# Patient Record
Sex: MALE | Race: BLACK OR AFRICAN AMERICAN | Employment: OTHER | URBAN - METROPOLITAN AREA
[De-identification: names, ages, dates, MRNs, and addresses within clinical notes are randomized per-mention and may not be internally consistent; named-entity substitution may affect disease eponyms.]

---

## 2017-04-18 RX ORDER — ATORVASTATIN CALCIUM 40 MG/1
TABLET, FILM COATED ORAL
Qty: 90 TABLET | Refills: 1 | Status: SHIPPED | OUTPATIENT
Start: 2017-04-18 | End: 2017-11-21 | Stop reason: SDUPTHER

## 2017-06-01 RX ORDER — INSULIN GLARGINE 100 [IU]/ML
INJECTION, SOLUTION SUBCUTANEOUS
Refills: 2 | OUTPATIENT
Start: 2017-06-01

## 2017-06-01 RX ORDER — INSULIN ASPART 100 [IU]/ML
INJECTION, SOLUTION INTRAVENOUS; SUBCUTANEOUS
Refills: 1 | OUTPATIENT
Start: 2017-06-01

## 2017-09-05 ENCOUNTER — TELEPHONE (OUTPATIENT)
Dept: CARDIOLOGY CLINIC | Age: 62
End: 2017-09-05

## 2017-09-06 ENCOUNTER — TELEPHONE (OUTPATIENT)
Dept: CARDIOLOGY CLINIC | Age: 62
End: 2017-09-06

## 2017-10-16 ENCOUNTER — TELEPHONE (OUTPATIENT)
Dept: CARDIOLOGY CLINIC | Age: 62
End: 2017-10-16

## 2017-10-16 NOTE — TELEPHONE ENCOUNTER
Called Dentist office [phone], spoke with Edie Barbosa. Informed her that patient has not been seen in our office for over 2-1/2/ years. She will call us back. Patient has an appointment scheduled with Dr. Rayne Espinosa on 11/21/17. No medications currently being filled by our office. Patient last seen in office on 2/6/15. Notes:   Assessment:      CAD (coronary artery disease)  Pt denies symptoms of angina today. Pt is on BBlocker, Statin and Antiplatelet therapy. I will discontinue Effient since he had his PCI in August of 2013 and he is unable to afford Effient at the present time.            HTN (hypertension)  Blood pressure is elevated today. He has not taken his blood pressure medicines this morning. I have encouraged him to take his medicines regularly and also work on low sodium diet.      Hyperlipidemia  Last lipid panel from 6/2014 was within acceptable limits. Pt will have liver/ lipid profile rechecked before the next visit.                                   Plan:         1. See above  2. Stop Effient. 3. Strongly encouraged to take BP medicines regularly and follow low sodium diet. 4. Have fasting lipids/liver prior to next visit. 5. Continue risk factor modifications.   6. Follow up in six months.

## 2017-10-16 NOTE — TELEPHONE ENCOUNTER
Dr. Rayne Espinosa is OOT this week. Unable to provide any type of clearance since he hasn't been seen in going on 3 years. He should obtain clearance from PCP. Otherwise, he can schedule the tooth extraction after he sees Dr. Rayne Espinosa at his next scheduled appt.

## 2017-10-26 LAB
AVERAGE GLUCOSE: NORMAL
HBA1C MFR BLD: 15.3 %

## 2017-11-21 ENCOUNTER — OFFICE VISIT (OUTPATIENT)
Dept: CARDIOLOGY CLINIC | Age: 62
End: 2017-11-21

## 2017-11-21 VITALS
HEIGHT: 75 IN | DIASTOLIC BLOOD PRESSURE: 88 MMHG | WEIGHT: 255 LBS | BODY MASS INDEX: 31.71 KG/M2 | SYSTOLIC BLOOD PRESSURE: 128 MMHG | HEART RATE: 74 BPM | OXYGEN SATURATION: 98 %

## 2017-11-21 DIAGNOSIS — E78.2 MIXED HYPERLIPIDEMIA: ICD-10-CM

## 2017-11-21 DIAGNOSIS — Z01.810 PREOP CARDIOVASCULAR EXAM: Primary | ICD-10-CM

## 2017-11-21 DIAGNOSIS — I10 ESSENTIAL HYPERTENSION: ICD-10-CM

## 2017-11-21 DIAGNOSIS — I25.10 CORONARY ARTERY DISEASE INVOLVING NATIVE CORONARY ARTERY OF NATIVE HEART WITHOUT ANGINA PECTORIS: ICD-10-CM

## 2017-11-21 PROCEDURE — 99214 OFFICE O/P EST MOD 30 MIN: CPT | Performed by: INTERNAL MEDICINE

## 2017-11-21 PROCEDURE — G8427 DOCREV CUR MEDS BY ELIG CLIN: HCPCS | Performed by: INTERNAL MEDICINE

## 2017-11-21 PROCEDURE — 1036F TOBACCO NON-USER: CPT | Performed by: INTERNAL MEDICINE

## 2017-11-21 PROCEDURE — G8417 CALC BMI ABV UP PARAM F/U: HCPCS | Performed by: INTERNAL MEDICINE

## 2017-11-21 PROCEDURE — 3017F COLORECTAL CA SCREEN DOC REV: CPT | Performed by: INTERNAL MEDICINE

## 2017-11-21 PROCEDURE — 93000 ELECTROCARDIOGRAM COMPLETE: CPT | Performed by: INTERNAL MEDICINE

## 2017-11-21 PROCEDURE — G8484 FLU IMMUNIZE NO ADMIN: HCPCS | Performed by: INTERNAL MEDICINE

## 2017-11-21 PROCEDURE — G8598 ASA/ANTIPLAT THER USED: HCPCS | Performed by: INTERNAL MEDICINE

## 2017-11-21 RX ORDER — ATORVASTATIN CALCIUM 80 MG/1
80 TABLET, FILM COATED ORAL DAILY
Qty: 30 TABLET | Refills: 6 | Status: SHIPPED | OUTPATIENT
Start: 2017-11-21

## 2017-11-21 NOTE — PROGRESS NOTES
injection pen Inject 55 Units into the skin nightly 10 Pen 3    ONETOUCH VERIO strip   3    Blood Glucose Monitoring Suppl (BLOOD GLUCOSE METER) KIT 1 Units by Does not apply route 3 times daily 1 kit 0    lisinopril (PRINIVIL;ZESTRIL) 10 MG tablet Take 1 tablet by mouth daily. 90 tablet 3    aspirin 81 MG chewable tablet Take 81 mg by mouth daily.  ONE TOUCH ULTRASOFT LANCETS MISC by Does not apply route 2 times daily. 60 each 12    glucose blood VI test strips (ONE TOUCH ULTRA TEST) strip 1 each by In Vitro route 2 times daily for 360 days. 60 strip 12     No current facility-administered medications for this visit. Review of Systems:  Review of systems is as detailed above and otherwise all other systems are negative. Physical Exam:   /88   Pulse 74   Ht 6' 3\" (1.905 m)   Wt 255 lb (115.7 kg) Comment: did not wish to remove shoes  SpO2 98%   BMI 31.87 kg/m²   Wt Readings from Last 3 Encounters:   11/21/17 255 lb (115.7 kg)   09/29/15 277 lb (125.6 kg)   08/03/15 267 lb (121.1 kg)     Constitutional: He is oriented to person, place, and time. He appears well-developed and well-nourished. In no acute distress. Head: Normocephalic and atraumatic. Pupils equal and round. Neck: Neck supple. No JVP or carotid bruit appreciated. No mass and no thyromegaly present. No lymphadenopathy present. Cardiovascular: Normal rate. Normal heart sounds. Exam reveals no gallop and no friction rub. No murmur heard. Pulmonary/Chest: Effort normal and breath sounds normal. No respiratory distress. He has no wheezes, rhonchi or rales. Abdominal: Soft, non-tender. Bowel sounds are normal. He exhibits no organomegaly, mass or bruit. Extremities: No edema, cyanosis, or clubbing. Pulses are 2+ radial/dorsalis pedis/posterior tibial/carotid bilaterally. Neurological: No gross cranial nerve deficit. Coordination normal.   Skin: Skin is warm and dry. There is no rash or diaphoresis.    Psychiatric: He

## 2017-11-21 NOTE — PATIENT INSTRUCTIONS
Patient Education        Heart-Healthy Diet: Care Instructions  Your Care Instructions    A heart-healthy diet has lots of vegetables, fruits, nuts, beans, and whole grains, and is low in salt. It limits foods that are high in saturated fat, such as meats, cheeses, and fried foods. It may be hard to change your diet, but even small changes can lower your risk of heart attack and heart disease. Follow-up care is a key part of your treatment and safety. Be sure to make and go to all appointments, and call your doctor if you are having problems. It's also a good idea to know your test results and keep a list of the medicines you take. How can you care for yourself at home? Watch your portions  · Learn what a serving is. A \"serving\" and a \"portion\" are not always the same thing. Make sure that you are not eating larger portions than are recommended. For example, a serving of pasta is ½ cup. A serving size of meat is 2 to 3 ounces. A 3-ounce serving is about the size of a deck of cards. Measure serving sizes until you are good at [place]" them. Keep in mind that restaurants often serve portions that are 2 or 3 times the size of one serving. · To keep your energy level up and keep you from feeling hungry, eat often but in smaller portions. · Eat only the number of calories you need to stay at a healthy weight. If you need to lose weight, eat fewer calories than your body burns (through exercise and other physical activity). Eat more fruits and vegetables  · Eat a variety of fruit and vegetables every day. Dark green, deep orange, red, or yellow fruits and vegetables are especially good for you. Examples include spinach, carrots, peaches, and berries. · Keep carrots, celery, and other veggies handy for snacks. Buy fruit that is in season and store it where you can see it so that you will be tempted to eat it. · Cook dishes that have a lot of veggies in them, such as stir-fries and soups.   Limit saturated and trans fat  · Read food labels, and try to avoid saturated and trans fats. They increase your risk of heart disease. Trans fat is found in many processed foods such as cookies and crackers. · Use olive or canola oil when you cook. Try cholesterol-lowering spreads, such as Benecol or Take Control. · Bake, broil, grill, or steam foods instead of frying them. · Choose lean meats instead of high-fat meats such as hot dogs and sausages. Cut off all visible fat when you prepare meat. · Eat fish, skinless poultry, and meat alternatives such as soy products instead of high-fat meats. Soy products, such as tofu, may be especially good for your heart. · Choose low-fat or fat-free milk and dairy products. Eat fish  · Eat at least two servings of fish a week. Certain fish, such as salmon and tuna, contain omega-3 fatty acids, which may help reduce your risk of heart attack. Eat foods high in fiber  · Eat a variety of grain products every day. Include whole-grain foods that have lots of fiber and nutrients. Examples of whole-grain foods include oats, whole wheat bread, and brown rice. · Buy whole-grain breads and cereals, instead of white bread or pastries. Limit salt and sodium  · Limit how much salt and sodium you eat to help lower your blood pressure. · Taste food before you salt it. Add only a little salt when you think you need it. With time, your taste buds will adjust to less salt. · Eat fewer snack items, fast foods, and other high-salt, processed foods. Check food labels for the amount of sodium in packaged foods. · Choose low-sodium versions of canned goods (such as soups, vegetables, and beans). Limit sugar  · Limit drinks and foods with added sugar. These include candy, desserts, and soda pop. Limit alcohol  · Limit alcohol to no more than 2 drinks a day for men and 1 drink a day for women. Too much alcohol can cause health problems. When should you call for help?   Watch closely for changes in your [employer], and be sure to contact your doctor if:  · You would like help planning heart-healthy meals. Where can you learn more? Go to https://chpepiceweb.[employer]VEN Networks. org and sign in to your [employer] account. Enter V137 in the "[employer]" box to learn more about \"Heart-Healthy Diet: Care Instructions. \"     If you do not have an account, please click on the \"Sign Up Now\" link. Current as of: April 3, 2017  Content Version: 11.3  © 5171-9981 [employer]. Care instructions adapted under license by [facility] ([facility]). If you have questions about a medical condition or this instruction, always ask your healthcare professional. [address] any warranty or liability for your use of this information. Patient Education        High Blood Pressure: Care Instructions  Your Care Instructions  If your blood pressure is usually above 140/90, you have high blood pressure, or hypertension. That means the top number is 140 or higher or the bottom number is 90 or higher, or both. Despite what a lot of people think, high blood pressure usually doesn't cause headaches or make you feel dizzy or lightheaded. It usually has no symptoms. But it does increase your risk for heart attack, stroke, and kidney or eye damage. The higher your blood pressure, the more your risk increases. Your doctor will give you a goal for your blood pressure. Your goal will be based on your health and your age. An example of a goal is to keep your blood pressure below 140/90. Lifestyle changes, such as eating healthy and being active, are always important to help lower blood pressure. You might also take medicine to reach your blood pressure goal.  Follow-up care is a key part of your treatment and safety. Be sure to make and go to all appointments, and call your doctor if you are having problems. It's also a good idea to know your test results and keep a list of the medicines you take.   How can you care for yourself at home? Medical treatment  · If you stop taking your medicine, your blood pressure will go back up. You may take one or more types of medicine to lower your blood pressure. Be safe with medicines. Take your medicine exactly as prescribed. Call your doctor if you think you are having a problem with your medicine. · Talk to your doctor before you start taking aspirin every day. Aspirin can help certain people lower their risk of a heart attack or stroke. But taking aspirin isn't right for everyone, because it can cause serious bleeding. · See your doctor regularly. You may need to see the doctor more often at first or until your blood pressure comes down. · If you are taking blood pressure medicine, talk to your doctor before you take decongestants or anti-inflammatory medicine, such as ibuprofen. Some of these medicines can raise blood pressure. · Learn how to check your blood pressure at home. Lifestyle changes  · Stay at a healthy weight. This is especially important if you put on weight around the waist. Losing even 10 pounds can help you lower your blood pressure. · If your doctor recommends it, get more exercise. Walking is a good choice. Bit by bit, increase the amount you walk every day. Try for at least 30 minutes on most days of the week. You also may want to swim, bike, or do other activities. · Avoid or limit alcohol. Talk to your doctor about whether you can drink any alcohol. · Try to limit how much sodium you eat to less than 2,300 milligrams (mg) a day. Your doctor may ask you to try to eat less than 1,500 mg a day. · Eat plenty of fruits (such as bananas and oranges), vegetables, legumes, whole grains, and low-fat dairy products. · Lower the amount of saturated fat in your diet. Saturated fat is found in animal products such as milk, cheese, and meat. Limiting these foods may help you lose weight and also lower your risk for heart disease. · Do not smoke.  Smoking increases your pressure is usually normal, but it goes above normal at least 2 times. Where can you learn more? Go to https://chpepiceweb.Tomfoolery. org and sign in to your [employer] account. Enter R880 in the [employer] box to learn more about \"High Blood Pressure: Care Instructions. \"     If you do not have an account, please click on the \"Sign Up Now\" link. Current as of: August 8, 2016  Content Version: 11.3  © 7842-2395 [employer]. Care instructions adapted under license by [facility] ([facility]). If you have questions about a medical condition or this instruction, always ask your healthcare professional. [place] [address] any warranty or liability for your use of this information. Patient Education        High Cholesterol: Care Instructions  Your Care Instructions  Cholesterol is a type of fat in your blood. It is needed for many body functions, such as making new cells. Cholesterol is made by your body. It also comes from food you eat. High cholesterol means that you have too much of the fat in your blood. This raises your risk of a heart attack and stroke. LDL and HDL are part of your total cholesterol. LDL is the \"bad\" cholesterol. High LDL can raise your risk for heart disease, heart attack, and stroke. HDL is the \"good\" cholesterol. It helps clear bad cholesterol from the body. High HDL is linked with a lower risk of heart disease, heart attack, and stroke. Your cholesterol levels help your doctor find out your risk for having a heart attack or stroke. You and your doctor can talk about whether you need to lower your risk and what treatment is best for you. A heart-healthy lifestyle along with medicines can help lower your cholesterol and your risk. The way you choose to lower your risk will depend on how high your risk is for heart attack and stroke. It will also depend on how you feel about taking medicines. Follow-up care is a key part of your treatment and safety. Be sure to make and go to all appointments, and call your doctor if you are having problems. It's also a good idea to know your test results and keep a list of the medicines you take. How can you care for yourself at home? · Eat a variety of foods every day. Good choices include fruits, vegetables, whole grains (like oatmeal), dried beans and peas, nuts and seeds, soy products (like tofu), and fat-free or low-fat dairy products. · Replace butter, margarine, and hydrogenated or partially hydrogenated oils with olive and canola oils. (Canola oil margarine without trans fat is fine.)  · Replace red meat with fish, poultry, and soy protein (like tofu). · Limit processed and packaged foods like chips, crackers, and cookies. · Bake, broil, or steam foods. Don't thompson them. · Be physically active. Get at least 30 minutes of exercise on most days of the week. Walking is a good choice. You also may want to do other activities, such as running, swimming, cycling, or playing tennis or team sports. · Stay at a healthy weight or lose weight by making the changes in eating and physical activity listed above. Losing just a small amount of weight, even 5 to 10 pounds, can reduce your risk for having a heart attack or stroke. · Do not smoke. When should you call for help? Watch closely for changes in your health, and be sure to contact your doctor if:  · You need help making lifestyle changes. · You have questions about your medicine. Where can you learn more? Go to https://[employer] [address].eNovance. org and sign in to your TelemetryWeb account. Enter S491 in the [facility] box to learn more about \"High Cholesterol: Care Instructions. \"     If you do not have an account, please click on the \"Sign Up Now\" link. Current as of: April 3, 2017  Content Version: 11.3  © 4142-0671 [employer]. Care instructions adapted under license by [facility] ([facility]).  If you have questions about a medical condition or this

## 2017-11-21 NOTE — LETTER
 atorvastatin (LIPITOR) 80 MG tablet Take 1 tablet by mouth daily 30 tablet 6    metoprolol succinate ER (TOPROL-XL) 25 MG XL tablet TAKE 1 TABLET BY MOUTH DAILY. 90 tablet 2    BD PEN NEEDLE RIK U/F 32G X 4 MM MISC USE FOUR TIMES  each 2    metFORMIN (GLUCOPHAGE) 1000 MG tablet Take 1 tablet by mouth 2 times daily (with meals) 180 tablet 3    insulin glargine (LANTUS SOLOSTAR) 100 UNIT/ML injection pen Inject 55 Units into the skin nightly 10 Pen 3    ONETOUCH VERIO strip   3    Blood Glucose Monitoring Suppl (BLOOD GLUCOSE METER) KIT 1 Units by Does not apply route 3 times daily 1 kit 0    lisinopril (PRINIVIL;ZESTRIL) 10 MG tablet Take 1 tablet by mouth daily. 90 tablet 3    aspirin 81 MG chewable tablet Take 81 mg by mouth daily.  ONE TOUCH ULTRASOFT LANCETS MISC by Does not apply route 2 times daily. 60 each 12    glucose blood VI test strips (ONE TOUCH ULTRA TEST) strip 1 each by In Vitro route 2 times daily for 360 days. 60 strip 12     No current facility-administered medications for this visit. Review of Systems:  Review of systems is as detailed above and otherwise all other systems are negative. Physical Exam:   /88   Pulse 74   Ht 6' 3\" (1.905 m)   Wt 255 lb (115.7 kg) Comment: did not wish to remove shoes  SpO2 98%   BMI 31.87 kg/m²    Wt Readings from Last 3 Encounters:   11/21/17 255 lb (115.7 kg)   09/29/15 277 lb (125.6 kg)   08/03/15 267 lb (121.1 kg)     Constitutional: He is oriented to person, place, and time. He appears well-developed and well-nourished. In no acute distress. Head: Normocephalic and atraumatic. Pupils equal and round. Neck: Neck supple. No JVP or carotid bruit appreciated. No mass and no thyromegaly present. No lymphadenopathy present. Cardiovascular: Normal rate. Normal heart sounds. Exam reveals no gallop and no friction rub. No murmur heard.   Pulmonary/Chest: Effort normal and breath sounds normal. No respiratory distress. He has no wheezes, rhonchi or rales. Abdominal: Soft, non-tender. Bowel sounds are normal. He exhibits no organomegaly, mass or bruit. Extremities: No edema, cyanosis, or clubbing. Pulses are 2+ radial/dorsalis pedis/posterior tibial/carotid bilaterally. Neurological: No gross cranial nerve deficit. Coordination normal.   Skin: Skin is warm and dry. There is no rash or diaphoresis. Psychiatric: He has a normal mood and affect. His speech is normal and behavior is normal.     Lab Review:     FLP:  10/26/17:  , , HDL 46,     EKG Interpretation:     Image Review:     ECHO 5/2015  Summary   Left ventricle size is normal.   Mild concentric left ventricular hypertrophy is present.   mild hypokinesis of anterior,septal & apical wall due to previous MI.   Ejection fraction is visually estimated to be 45-50 %.   There is reversal of E/A inflow velocities across the mitral valve   suggesting impaired left ventricular relaxation.   The mitral valve leaflets are slightly thickened.   Trivial mitral regurgitation is present.   The aortic valve is normal in structure and function.   Trivial aortic regurgitation is present.   Tricuspid valve is structurally normal.   Trivial tricuspid regurgitation.     8/26/13 Cath  OVERALL IMPRESSION:  1. One hundred percent occlusion of the mid right coronary artery with the  left to right and right to right collaterals. 2. Patent left main trunk. 3. Ninety-eight to 95% stenosis with a ruptured plaque in the mid left  anterior descending artery. About 60% stenosis of the left anterior  descending proximal to the ruptured plaque. 4. Eighty to 90% stenosis of the ostial diagonal branch. 5. Patent circumflex artery with patent stented high obtuse marginal branch. 6. Elevated left ventricular end-diastolic pressure, but left ventriculogram  not performed.   7. Successful angioplasty followed by drug-eluting stent deployment in the

## 2018-02-06 LAB
AVERAGE GLUCOSE: NORMAL
HBA1C MFR BLD: 14 %

## 2021-07-19 ENCOUNTER — APPOINTMENT (OUTPATIENT)
Dept: GENERAL RADIOLOGY | Age: 66
End: 2021-07-19
Payer: COMMERCIAL

## 2021-07-19 ENCOUNTER — HOSPITAL ENCOUNTER (EMERGENCY)
Age: 66
Discharge: HOME | End: 2021-07-20
Attending: EMERGENCY MEDICINE
Payer: COMMERCIAL

## 2021-07-19 DIAGNOSIS — M25.559 CHRONIC HIP PAIN, UNSPECIFIED LATERALITY: ICD-10-CM

## 2021-07-19 DIAGNOSIS — G89.29 CHRONIC HIP PAIN, UNSPECIFIED LATERALITY: ICD-10-CM

## 2021-07-19 DIAGNOSIS — M25.579 CHRONIC ANKLE PAIN, UNSPECIFIED LATERALITY: Primary | ICD-10-CM

## 2021-07-19 DIAGNOSIS — M25.562 ACUTE PAIN OF LEFT KNEE: ICD-10-CM

## 2021-07-19 DIAGNOSIS — R93.6 ABNORMAL X-RAY OF LOWER EXTREMITY: ICD-10-CM

## 2021-07-19 DIAGNOSIS — G89.29 CHRONIC ANKLE PAIN, UNSPECIFIED LATERALITY: Primary | ICD-10-CM

## 2021-07-19 PROCEDURE — 73560 X-RAY EXAM OF KNEE 1 OR 2: CPT

## 2021-07-19 PROCEDURE — 96372 THER/PROPH/DIAG INJ SC/IM: CPT

## 2021-07-19 PROCEDURE — 73562 X-RAY EXAM OF KNEE 3: CPT

## 2021-07-19 PROCEDURE — 73502 X-RAY EXAM HIP UNI 2-3 VIEWS: CPT

## 2021-07-19 PROCEDURE — 99284 EMERGENCY DEPT VISIT MOD MDM: CPT

## 2021-07-19 PROCEDURE — 6360000002 HC RX W HCPCS: Performed by: EMERGENCY MEDICINE

## 2021-07-19 PROCEDURE — 73610 X-RAY EXAM OF ANKLE: CPT

## 2021-07-19 RX ORDER — GABAPENTIN 300 MG/1
300 CAPSULE ORAL ONCE
Status: COMPLETED | OUTPATIENT
Start: 2021-07-19 | End: 2021-07-20

## 2021-07-19 RX ORDER — MORPHINE SULFATE 10 MG/ML
6 INJECTION, SOLUTION INTRAMUSCULAR; INTRAVENOUS ONCE
Status: COMPLETED | OUTPATIENT
Start: 2021-07-19 | End: 2021-07-19

## 2021-07-19 RX ORDER — CYCLOBENZAPRINE HCL 10 MG
10 TABLET ORAL ONCE
Status: COMPLETED | OUTPATIENT
Start: 2021-07-19 | End: 2021-07-20

## 2021-07-19 RX ORDER — CYCLOBENZAPRINE HCL 5 MG
5 TABLET ORAL 2 TIMES DAILY PRN
Qty: 10 TABLET | Refills: 0 | Status: SHIPPED | OUTPATIENT
Start: 2021-07-19 | End: 2021-07-29

## 2021-07-19 RX ADMIN — MORPHINE SULFATE 6 MG: 10 INJECTION, SOLUTION INTRAMUSCULAR; INTRAVENOUS at 22:50

## 2021-07-19 ASSESSMENT — PAIN DESCRIPTION - ONSET: ONSET: GRADUAL

## 2021-07-19 ASSESSMENT — PAIN DESCRIPTION - LOCATION: LOCATION: ANKLE;KNEE;HIP

## 2021-07-19 ASSESSMENT — PAIN SCALES - GENERAL
PAINLEVEL_OUTOF10: 10
PAINLEVEL_OUTOF10: 10
PAINLEVEL_OUTOF10: 8

## 2021-07-19 ASSESSMENT — PAIN DESCRIPTION - DESCRIPTORS: DESCRIPTORS: SHARP

## 2021-07-19 ASSESSMENT — PAIN DESCRIPTION - PAIN TYPE: TYPE: ACUTE PAIN

## 2021-07-19 ASSESSMENT — PAIN DESCRIPTION - FREQUENCY: FREQUENCY: CONTINUOUS

## 2021-07-19 NOTE — LETTER
[facility] Emergency Department  [address] [facility] [address]  Phone: [phone]               July 20, 2021    Patient: Bora Ma   YOB: 1955   Date of Visit: 7/19/2021       To Whom It May Concern:    Ivan Tan was seen and treated in our emergency department on 7/19/2021. He may return to work on 7/21/21.       Sincerely,       Nurse        Signature:__________________________________

## 2021-07-20 VITALS
DIASTOLIC BLOOD PRESSURE: 78 MMHG | RESPIRATION RATE: 18 BRPM | SYSTOLIC BLOOD PRESSURE: 122 MMHG | BODY MASS INDEX: 29.84 KG/M2 | OXYGEN SATURATION: 99 % | WEIGHT: 240 LBS | TEMPERATURE: 98.6 F | HEART RATE: 68 BPM | HEIGHT: 75 IN

## 2021-07-20 PROCEDURE — 6370000000 HC RX 637 (ALT 250 FOR IP): Performed by: EMERGENCY MEDICINE

## 2021-07-20 RX ADMIN — CYCLOBENZAPRINE 10 MG: 10 TABLET, FILM COATED ORAL at 00:05

## 2021-07-20 RX ADMIN — GABAPENTIN 300 MG: 300 CAPSULE ORAL at 00:05

## 2021-07-20 ASSESSMENT — ENCOUNTER SYMPTOMS
SHORTNESS OF BREATH: 0
STRIDOR: 0
ANAL BLEEDING: 0
RECTAL PAIN: 0
WHEEZING: 0
COUGH: 0
APNEA: 0
EYE ITCHING: 0
EYE DISCHARGE: 0
EYE PAIN: 0
ABDOMINAL PAIN: 0
VOMITING: 0
COLOR CHANGE: 0
PHOTOPHOBIA: 0
BLOOD IN STOOL: 0
ABDOMINAL DISTENTION: 0
CHOKING: 0
BACK PAIN: 0
NAUSEA: 0
CONSTIPATION: 0
CHEST TIGHTNESS: 0
DIARRHEA: 0
EYE REDNESS: 0

## 2021-07-20 ASSESSMENT — PAIN SCALES - GENERAL: PAINLEVEL_OUTOF10: 8

## 2021-07-20 ASSESSMENT — PAIN - FUNCTIONAL ASSESSMENT: PAIN_FUNCTIONAL_ASSESSMENT: 0-10

## 2021-07-20 NOTE — ED PROVIDER NOTES
Positive for arthralgias. Negative for back pain. Skin: Negative for color change and pallor. Neurological: Negative for dizziness and facial asymmetry. Hematological: Negative for adenopathy. Does not bruise/bleed easily. Psychiatric/Behavioral: Negative for agitation, behavioral problems, confusion and decreased concentration. Except as noted above the remainder of the review of systems was reviewed and negative. PAST MEDICAL HISTORY     Past Medical History:   Diagnosis Date    Acute MI, inferior wall ([address]     2013    CAD (coronary artery disease)     2013 with stenting 2012 and 2013    Chronic back pain     has had 2 epidural injections    Diabetes mellitus, type 2 (HCC)     Diverticulosis     Hyperlipidemia     Hypertension     Insomnia        SURGICAL HISTORY       Past Surgical History:   Procedure Laterality Date    ABSCESS DRAINAGE  5/13/15    I and D meri rectal abscess    CORONARY ANGIOPLASTY WITH STENT PLACEMENT      x 3    SHOULDER SURGERY      TOTAL KNEE ARTHROPLASTY Left        CURRENT MEDICATIONS       Discharge Medication List as of 7/20/2021 12:08 AM      CONTINUE these medications which have NOT CHANGED    Details   atorvastatin (LIPITOR) 80 MG tablet Take 1 tablet by mouth daily, Disp-30 tablet, R-6Normal      metoprolol succinate ER (TOPROL-XL) 25 MG XL tablet TAKE 1 TABLET BY MOUTH DAILY. , Disp-90 tablet, R-2      BD PEN NEEDLE RIK U/F 32G X 4 MM MISC Disp-100 each, R-2, Normal      metFORMIN (GLUCOPHAGE) 1000 MG tablet Take 1 tablet by mouth 2 times daily (with meals), Disp-180 tablet, R-3      insulin glargine (LANTUS SOLOSTAR) 100 UNIT/ML injection pen Inject 55 Units into the skin nightly, Disp-10 Pen, R-3      ONETOUCH VERIO strip , R-3      Blood Glucose Monitoring Suppl (BLOOD GLUCOSE METER) KIT 3 TIMES DAILY Starting 5/18/2015, Until Discontinued, Disp-1 kit, R-0, Print      lisinopril (PRINIVIL;ZESTRIL) 10 MG tablet Take 1 tablet by mouth daily. , Disp-90 tablet, R-3Patient needs to keep appt and get lab work for further refills. aspirin 81 MG chewable tablet Take 81 mg by mouth daily. ONE TOUCH ULTRASOFT LANCETS MISC 2 TIMES DAILY Starting 4/4/2011, Until Discontinued, Disp-60 each, R-12, Normal             ALLERGIES     Aspirin    FAMILY HISTORY        Family History   Problem Relation Age of Onset    Diabetes Mother     Cancer Father     Diabetes Sister        SOCIAL HISTORY       Social History     Socioeconomic History    Marital status:      Spouse name: None    Number of children: 3    Years of education: None    Highest education level: None   Occupational History    Occupation: [employer]   Tobacco Use    Smoking status: Former Smoker     Years: 37.00     Quit date: 12/31/2010     Years since quitting: 10.5    Smokeless tobacco: Never Used   Substance and Sexual Activity    Alcohol use: No    Drug use: No    Sexual activity: None     Comment: wife   Other Topics Concern    None   Social History Narrative    None     Social Determinants of Health     Financial Resource Strain:     Difficulty of Paying Living Expenses:    Food Insecurity:     Worried About Running Out of Food in the Last Year:     Ran Out of Food in the Last Year:    Transportation Needs:     Lack of Transportation (Medical):      Lack of Transportation (Non-Medical):    Physical Activity:     Days of Exercise per Week:     Minutes of Exercise per Session:    Stress:     Feeling of Stress :    Social Connections:     Frequency of Communication with Friends and Family:     Frequency of Social Gatherings with Friends and Family:     Attends Oriental orthodox Services:     Active Member of Clubs or Organizations:     Attends Club or Organization Meetings:     Marital Status:    Intimate Partner Violence:     Fear of Current or Ex-Partner:     Emotionally Abused:     Physically Abused:     Sexually Abused:        [address]       ED Triage Vitals [07/19/21 2026]   BP Temp Temp Source Pulse Resp SpO2 Height Weight   106/74 98.6 °F (37 °C) Oral 88 16 99 % 6' 3\" (1.905 m) 240 lb (108.9 kg)       Physical Exam  Vitals and nursing note reviewed. Constitutional:       General: He is not in acute distress. Appearance: He is well-developed. He is not diaphoretic. HENT:      Head: Normocephalic and atraumatic. Eyes:      General:         Right eye: No discharge. Left eye: No discharge. Pupils: Pupils are equal, round, and reactive to light. Neck:      Thyroid: No thyromegaly. Trachea: No tracheal deviation. Cardiovascular:      Rate and Rhythm: Normal rate and regular rhythm. Heart sounds: No murmur heard. Pulmonary:      Breath sounds: No wheezing or rales. Chest:      Chest wall: No tenderness. Abdominal:      General: There is no distension. Palpations: Abdomen is soft. There is no mass. Tenderness: There is no abdominal tenderness. There is no guarding or rebound. Musculoskeletal:         General: No tenderness or deformity. Normal range of motion. Cervical back: Normal range of motion. Skin:     General: Skin is warm. Coloration: Skin is not pale. Findings: No erythema or rash. Comments: No signs of trauma. Well-appearing left ankle left knee left hip. Soft compartments. Strong distal pulses present. 5 out of 5 strength. Ambulates with steady gait. No significant tenderness palpation noted. Neurological:      Mental Status: He is alert. Cranial Nerves: No cranial nerve deficit. Motor: No abnormal muscle tone.       Coordination: Coordination normal.         DIAGNOSTIC RESULTS     EKG: All EKG's are interpreted by the Emergency Department Physician who either signs or Co-signs this chart in the absence of acardiologist.    None    RADIOLOGY:   Non-plain film images such as CT, Ultrasoundand MRI are read by the radiologist. Plain radiographic images are visualized and preliminarily interpreted by the emergency physician with the below findings:    Impression   No acute fracture.       Moderate effusion.       ANKLE FINDINGS:   No acute fracture. No dislocation.  Mortise appears intact on suboptimal   mortise view. Trace effusion.  Bone infarct or enchondroma in the distal   tibial diaphysis.  Subcentimeter ossicle projecting over the medial joint   space.  Scattered bony spurring.  Vascular calcifications.       IMPRESSION:   No acute fracture or dislocation.       Subcentimeter ossicle projecting over the medial joint space. Impression   1. No acute osseous abnormality. 2. Nonspecific irregular lucencies in the medial right acetabulum and   ischium.  Recommend further evaluation with CT or MRI. ED BEDSIDE ULTRASOUND:   Performed by ED Physician - none    LABS:  Labs Reviewed - No data to display    All other labs were withinnormal range or not returned as of this dictation. EMERGENCY DEPARTMENT COURSE and DIFFERENTIAL DIAGNOSIS/MDM:     PMH, Surgical Hx, FH, Social Hx reviewed by myself (ETOH usage, Tobacco usage, Drug usage reviewed by myself, no pertinent Hx)- No Pertinent Hx     Old records were reviewed by me    Had long conversation with patient about x-ray results. Including irregular lucencies in the medial right acetabulum. Told patient he needs to get an MRI outpatient. Also had discussion with patient about possible projecting over the medial joint space. Discussed importance of following up with orthopedics. Patient feels better. Tolerating p.o. Pain well controlled. Discussed strict PCP and orthopedic follow-up. I estimate there is LOW risk for Sepsis, MI, Stroke, Tamponade, PTX, Toxicity or other life threatening etiology thus I consider the discharge disposition reasonable. The patient is at low risk for mortality based on demographic, history and clinical factors.  Given the best available information and clinical assessment, I estimate the risk of hospitalization to be greater than risk of treatment at home. I have explained to the patient that the risk could rapidly change, given precautions for return and instructions. Explained to patient that the risk for mortality is low based on demographic, history and clinical factors. I discussed with patient the results of evaluation in the ED, diagnosis, care, and prognosis. The plan is to discharge to home. Patient is in agreement with plan and questions have been answered. I also discussed with patient the reasons which may require a return visit and the importance of follow-up care. The patient is well-appearing, nontoxic, and improved at the time of discharge. Patient agrees to call to arrange follow-up care as directed. Patient understands to return immediately for worsening/change in symptoms. CRITICAL CARE TIME   Total Critical Caretime was 21 minutes, excluding separately reportable procedures. There was a high probability of clinically significant/life threatening deterioration in the patient's condition which required my urgent intervention. PROCEDURES:  Unlessotherwise noted below, none    FINAL IMPRESSION      1. Chronic ankle pain, unspecified laterality    2. Chronic hip pain, unspecified laterality    3. Abnormal x-ray of lower extremity          DISPOSITION/PLAN   DISPOSITION Decision To Discharge 07/19/2021 11:38:17 PM    PATIENT REFERRED TO:  Marina Trinh, [address] [facility]  [address] [place] [address]  [phone]    Call today  Please follow up as soon as possible.  Need MRI Pelvis oupatient      DISCHARGE MEDICATIONS:  Discharge Medication List as of 7/20/2021 12:08 AM      START taking these medications    Details   cyclobenzaprine (FLEXERIL) 5 MG tablet Take 1 tablet by mouth 2 times daily as needed for Muscle spasms, Disp-10 tablet, R-0Print                (Please note that portions ofthis note were completed with a voice recognition

## 2023-01-01 ENCOUNTER — HOSPITAL ENCOUNTER (EMERGENCY)
Age: 68
End: 2023-12-10
Attending: EMERGENCY MEDICINE

## 2023-01-01 DIAGNOSIS — I46.9 CARDIOPULMONARY ARREST (HCC): Primary | ICD-10-CM

## 2023-01-01 PROCEDURE — 99283 EMERGENCY DEPT VISIT LOW MDM: CPT

## 2023-01-01 PROCEDURE — 92950 HEART/LUNG RESUSCITATION CPR: CPT

## 2023-12-10 NOTE — ED PROVIDER NOTES
EMERGENCY MEDICINE ATTENDING NOTE  Santosh Bettencourt. Frieda Moctezuma., DO, FACEP, [address]  [place] COMPLAINT  Chief Complaint   Patient presents with    Cardiac Arrest        HISTORY OF PRESENT ILLNESS  Lauri Cancino is a 79 y.o. male who presents to the ED for evaluation of cardiac arrest.  The patient was supposed to go to family member's house for dinner today. He did not show up and family went to search for him. He found in his driveway in his car unresponsive. They called [phone] and police and EMS started CPR upon arrival.  Family states that he was supposed to pick someone up at 5 which means he would have to have needed to leave around 491 so it is uncertain how long he may have been down prior to this but could have been as long ago as 430. Patient was cool to the touch however given the situation with the patient being out in the open with all the family gathered around the decided to start CPR and continuing bring him here. Patient does have an airway device and IO and received multiple rounds of epinephrine. Patient has been in asystole throughout. Does have known history of diabetes and heart issues and his glucose was in the 130s for EMS. Nursing/triage notes reviewed. No other complaints, modifying factors or associated symptoms. REVIEW OF SYSTEMS:  All systems are reviewed and are negative unless noted in the HPI.     PAST MEDICAL HISTORY  Past Medical History:   Diagnosis Date    Acute MI, inferior wall ([address])     2013    CAD (coronary artery disease)     2013 with stenting 2012 and 2013    Chronic back pain     has had 2 epidural injections    Diabetes mellitus, type 2 ([address])     Diverticulosis     Hyperlipidemia     Hypertension     Insomnia        SURGICAL HISTORY  Past Surgical History:   Procedure Laterality Date    ABCESS DRAINAGE  5/13/15    I and D meri rectal abscess    CORONARY ANGIOPLASTY WITH STENT PLACEMENT      x 3    SHOULDER SURGERY      TOTAL KNEE ARTHROPLASTY Left

## 2023-12-10 NOTE — ED NOTES
Patient arrived via [address] from home, patient was supposed to meet family for dinner at 65, family went to house and found patient in car unresponsive with no pulse. Patient pulled from car, [facility] Patient received 4 rounds of epi en route and has IO to FIL[facility] barrow. Dr. Glendy Fierro and RT at bedside. EMS states was dispatched for call at [phone].  Unknown how long patient has been down, anywhere from 1630 until time of arrival.     1839 epi given  1840 bicarb given  1841 pulse check, asystole, CPR resumed  1843 Asystole  1843 Time of death       Serenity Lopez RN  12/10/23 1844

## 2023-12-10 NOTE — ED NOTES
[facility] is placing hold on patient.     Referral #[id number], spoke with Álvaro Shaver, RN  12/10/23 6154

## 2023-12-11 NOTE — ED NOTES
Spoke with Jessica Garcia, medical resident at patient's PCP office and updated on patient's status. Will have PCP sign death certificate.       Anisa Lee RN  12/10/23 1948     Anisa Lee RN  12/10/23 2006

## 2023-12-11 NOTE — ED NOTES
[facility] notified that family has left and [place] [facility]       Daisy Silverman RN  12/10/23 7154